# Patient Record
Sex: MALE | Race: WHITE
[De-identification: names, ages, dates, MRNs, and addresses within clinical notes are randomized per-mention and may not be internally consistent; named-entity substitution may affect disease eponyms.]

---

## 2017-02-28 ENCOUNTER — HOSPITAL ENCOUNTER (EMERGENCY)
Dept: HOSPITAL 25 - ED | Age: 27
Discharge: HOME | End: 2017-02-28
Payer: COMMERCIAL

## 2017-02-28 VITALS — SYSTOLIC BLOOD PRESSURE: 117 MMHG | DIASTOLIC BLOOD PRESSURE: 74 MMHG

## 2017-02-28 DIAGNOSIS — S62.501B: Primary | ICD-10-CM
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
## 2017-02-28 DIAGNOSIS — S61.011A: ICD-10-CM
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
## 2017-02-28 PROCEDURE — 90471 IMMUNIZATION ADMIN: CPT

## 2017-02-28 PROCEDURE — 90715 TDAP VACCINE 7 YRS/> IM: CPT

## 2017-02-28 PROCEDURE — 99282 EMERGENCY DEPT VISIT SF MDM: CPT

## 2017-02-28 NOTE — RAD
Indication: Right thumb injury.



2 views of the right thumb demonstrates a fracture through the distal tuft of the distal

phalanx of the thumb consistent with partial amputation.



IMPRESSION: Fracture through the distal tuft likely due to partial amputation of the right

distal thumb.

## 2017-02-28 NOTE — ED
Laceration/Wound HPI





- HPI Summary


HPI Summary: 


26m presents with right thumb laceration today.  He was using a table saw when 

the board he was holding got sucked into the saw.  He has full ROM of his 

finger.  He states that he does not feel any numbness or tingling, he only 

feels pain.  His last tetanus was 6 years ago.  He is left handed. 








- History of Current Complaint


Stated Complaint: RT HAND THUMB LAC


Time Seen by Provider: 02/28/17 13:51


Pain Intensity: 6





- Allergy/Home Medications


Allergies/Adverse Reactions: 


 Allergies











Allergy/AdvReac Type Severity Reaction Status Date / Time


 


No Known Allergies Allergy   Verified 02/28/17 16:33














PMH/Surg Hx/FS Hx/Imm Hx


Endocrine/Hematology History: 


   Denies: Hx Anticoagulant Therapy


Cardiovascular History: 


   Denies: Hx Hypertension


Infectious Disease History: No


Infectious Disease History: 


   Denies: Traveled Outside the [place] in Last 30 Days





- Family History


Known Family History: 


   Negative: Cardiac Disease





- Social History


Alcohol Use: Occasionally


Substance Use Type: Reports: None


Smoking Status (MU): Unknown if Ever Smoked





Review of Systems


Negative: Fever


Negative: Chest Pain


Negative: Shortness Of Breath


Positive: Other - right thumb laceration


All Other Systems Reviewed And Are Negative: Yes





Physical Exam


Triage Information Reviewed: Yes


Vital Signs On Initial Exam: 


 Initial Vitals











Temp Pulse Resp BP Pulse Ox


 


 97.3 F   72   20   117/74   100 


 


 02/28/17 13:43  02/28/17 13:43  02/28/17 13:43  02/28/17 13:43  02/28/17 13:43











Vital Signs Reviewed: Yes


Appearance: Positive: Well-Appearing


Skin: Positive: Warm, Dry, Other - large laceration of right thumb with skin 

avulsed 6 cm long and irregular by 2 cm wide


Head/Face: Positive: Normal Head/Face Inspection


Eyes: Positive: Normal, Conjunctiva Clear


Respiratory/Lung Sounds: Positive: Clear to Auscultation, Breath Sounds Present


Cardiovascular: Positive: Normal, RRR


Musculoskeletal: Positive: Other - nail attached with good capillary refill, 

sensation grossly intact, full ROM of thumb





Procedures





- Laceration/Wound Repair


  ** 1


Location: Other - right thumb


Description: Linear


Anesthesia: Digital, 1.0%


Length, Depth and Shape: 6cm by 2cm irregular with multiple intersecting 

laceration, skin avulsion


Betadine Prep?: Yes


Irrigated w/ Saline (ccs): 1,000


Laceration/Wound Explored: clean


Closure: Single Layer


Debridement: moderate


Suture Type: Prolene - 4-0


Number of Sutures: 10


Layer Closure?: No


Sterile Dressing Applied?: No





Diagnostics





- Vital Signs


 Vital Signs











  Temp Pulse Resp BP Pulse Ox


 


 02/28/17 13:43  97.3 F  72  20  117/74  100














- Laboratory


Lab Statement: Any lab studies that have been ordered have been reviewed, and 

results considered in the medical decision making process.





Laceration Repair Course/Dx





- Course


Course Of Treatment: 26M presents with right thumb laceration from table saw. 

is left handed, full ROM of finger, has tuff fracture so treated with ancef and 

tetanus shot, tried to reapproximate edges as well as could with moderate 

debridement but area had large avulsion and was unable to suture area, had dr mario look at wound and areas are can not be sutured, placed surgicel on 

area and applied pressure dressing and splint and told to follow up with ortho 

for evaulation of tendons of hands, placed on antibiotics, patient understands 

and agrees with plan





- Differential Dx


Differental Diagnoses: Avulsion, Fracture, Laceration, Tendon Laceration





- Clinical Impression


Provider Diagnoses: 


 Laceration of right thumb, Open fracture of tuft of distal phalanx of finger








Discharge





- Discharge Plan


Condition: Good


Disposition: HOME


Prescriptions: 


Cephalexin CAP* [Keflex CAP*] 500 mg PO BID #20 cap


Ibuprofen TAB* [Motrin TAB* 800 MG] 800 mg PO Q6H #30 tab


Patient Education Materials:  Care For Your Stitches (ED)


Referrals: 


Michele Butt MD [Medical Doctor] - 


No Primary Care Phys,NOPCP [Primary Care Provider] - 


Additional Instructions: 


Take antibiotic twice a day for 10 days starting tomorrow


Take ibuprofen every 6 hours for pain as needed


Change dressing tomorrow leaving absorbable hemostat on wound and rewrap, 


Keep brace on area until follow up with ortho


Follow up with ortho


Return to ED if 10-14 days to have sutures removed


Return to ED if develop signs of infection such as fever, spreading redness, or 

pus.

## 2017-09-24 ENCOUNTER — HOSPITAL ENCOUNTER (EMERGENCY)
Dept: HOSPITAL 25 - ED | Age: 27
Discharge: HOME | End: 2017-09-24
Payer: COMMERCIAL

## 2017-09-24 VITALS — SYSTOLIC BLOOD PRESSURE: 125 MMHG | DIASTOLIC BLOOD PRESSURE: 83 MMHG

## 2017-09-24 DIAGNOSIS — M25.461: ICD-10-CM

## 2017-09-24 DIAGNOSIS — M25.561: Primary | ICD-10-CM

## 2017-09-24 LAB
ADD DIFF/SLIDE REVIEW?: (no result)
ALBUMIN SERPL BCG-MCNC: 4.4 G/DL (ref 3.2–5.2)
ALP SERPL-CCNC: 50 U/L (ref 34–104)
ALT SERPL W P-5'-P-CCNC: 20 U/L (ref 7–52)
ANION GAP SERPL CALC-SCNC: 4 MMOL/L (ref 2–11)
AST SERPL-CCNC: 16 U/L (ref 13–39)
BUN SERPL-MCNC: 15 MG/DL (ref 6–24)
BUN/CREAT SERPL: 19 (ref 8–20)
CALCIUM SERPL-MCNC: 9.3 MG/DL (ref 8.6–10.3)
CHLORIDE SERPL-SCNC: 101 MMOL/L (ref 101–111)
GLOBULIN SER CALC-MCNC: 2.4 G/DL (ref 2–4)
GLUCOSE SERPL-MCNC: 97 MG/DL (ref 70–100)
HCO3 SERPL-SCNC: 30 MMOL/L (ref 22–32)
HCT VFR BLD AUTO: 46 % (ref 42–52)
HGB BLD-MCNC: 15.9 G/DL (ref 14–18)
MCH RBC QN AUTO: 31 PG (ref 27–31)
MCHC RBC AUTO-ENTMCNC: 35 G/DL (ref 31–36)
MCV RBC AUTO: 89 FL (ref 80–94)
POTASSIUM SERPL-SCNC: 4 MMOL/L (ref 3.5–5)
PROT SERPL-MCNC: 6.8 G/DL (ref 6.4–8.9)
RBC # BLD AUTO: 5.15 10^6/UL (ref 4–5.4)
SODIUM SERPL-SCNC: 135 MMOL/L (ref 133–145)
URATE SERPL-MCNC: 5.6 MG/DL (ref 4.4–7.6)
WBC # BLD AUTO: 4.6 10^3/UL (ref 3.5–10.8)

## 2017-09-24 PROCEDURE — 83605 ASSAY OF LACTIC ACID: CPT

## 2017-09-24 PROCEDURE — 85025 COMPLETE CBC W/AUTO DIFF WBC: CPT

## 2017-09-24 PROCEDURE — 36415 COLL VENOUS BLD VENIPUNCTURE: CPT

## 2017-09-24 PROCEDURE — 80053 COMPREHEN METABOLIC PANEL: CPT

## 2017-09-24 PROCEDURE — 99282 EMERGENCY DEPT VISIT SF MDM: CPT

## 2017-09-24 PROCEDURE — 86140 C-REACTIVE PROTEIN: CPT

## 2017-09-24 PROCEDURE — 85652 RBC SED RATE AUTOMATED: CPT

## 2017-09-24 PROCEDURE — 84550 ASSAY OF BLOOD/URIC ACID: CPT

## 2017-09-24 NOTE — ED
Lower Extremity





- HPI Summary


HPI Summary: 





27 male presents to ED with complaints of right knee pain, and concern for 

infection. States it began 4-5 days ago and he was seen at  for the same 

complaint. Started on augmentin, has taken 3.5 days doses and states has not 

had improvement. States he popped a "pimple" with a needle on skin of right 

knee cap 5 days ago and since has been having "burning, sharp, shooting" pain 

and swelling. States the "pimple" looks much better and he has just been 

covering with band-aid. No other complaints other than having some shooting 

pain in right lower leg intermittently. Admits to some swelling and that it 

feels warm. Has not taken any other medications. Admits to kneeling on his 

knees a lot at work without wearing his knee pads. No other PMHx. Denies fever 

and current erythema. Denies numbness/tingling. Able to bear weight and walk.





- History of Current Complaint


Chief Complaint: EDExtremityLower


Stated Complaint: RT KNEE POSS INFECTION/BURNING PAIN


Time Seen by Provider: 09/24/17 10:55


Hx Obtained From: Patient


Mechanism Of Injury: Unknown, Other - popped small abscess over right knee cap


Onset of Pain: Days


Onset/Duration: Still Present


Severity Initially: Mild


Severity Currently: Moderate


Pain Intensity: 5


Pain Scale Used: 0-10 Numeric


Timing: Intermittent


Location: Is Discrete @ - right knee


Character Of Pain: Sharp, Aching, Burning


Associated Signs And Symptoms: Positive: Swelling, Redness - "resolved", Knee 

Pain


Aggravating Factor(s): Standing, Ambulation, Weight Bearing


Alleviating Factor(s): Rest


Able to Bear Weight: Yes - no issue with walking 





- Allergies/Home Medications


Allergies/Adverse Reactions: 


 Allergies











Allergy/AdvReac Type Severity Reaction Status Date / Time


 


No Known Allergies Allergy   Verified 09/24/17 10:50














PMH/Surg Hx/FS Hx/Imm Hx


Endocrine/Hematology History: 


   Denies: Hx Anticoagulant Therapy


Cardiovascular History: 


   Denies: Hx Hypertension


Musculoskeletal History: 


   Denies: Hx Arthritis





- Surgical History


Surgery Procedure, Year, and Place: no





- Immunization History


Immunizations Up to Date: Yes


Infectious Disease History: No


Infectious Disease History: 


   Denies: Traveled Outside the [place] in Last 30 Days





- Family History


Known Family History: 


   Negative: Cardiac Disease





- Social History


Alcohol Use: Occasionally


Substance Use Type: Reports: None


Smoking Status (MU): Unknown if Ever Smoked





Review of Systems


Constitutional: Negative


Cardiovascular: Negative


Respiratory: Negative


Gastrointestinal: Negative


Positive: Arthralgia, Myalgia - right knee, Edema - right knee


Positive: Rash - right knee- resolved


Neurological: Negative


All Other Systems Reviewed And Are Negative: Yes





Physical Exam


Triage Information Reviewed: Yes


Vital Signs On Initial Exam: 


 Initial Vitals











Temp Pulse Resp BP Pulse Ox


 


 97.8 F   85   15   125/83   98 


 


 09/24/17 10:47  09/24/17 10:47  09/24/17 10:47  09/24/17 10:47  09/24/17 10:47











Vital Signs Reviewed: Yes


Appearance: Positive: Well-Appearing, No Pain Distress, Well-Nourished


Skin: Positive: Warm, Skin Color Reflects Adequate Perfusion, Dry, Cold, 

Erythema @ - small pea-sized erythematous scab over right knee appears to be 

healing, no drainage, edema or concern for infection. non raised. no 

surrounding cellulitis.  Negative: Soft, Pale, Weeping Skin/Lesions


Head/Face: Positive: Normal Head/Face Inspection


Eyes: Positive: Conjunctiva Clear


ENT: Positive: Hearing grossly normal


Neck: Positive: Supple, Nontender, No Lymphadenopathy


Respiratory/Lung Sounds: Positive: Clear to Auscultation, Breath Sounds 

Present.  Negative: Rales, Rhonchi, Wheezes


Cardiovascular: Positive: Normal, RRR, Pulses are Symmetrical in both Upper and 

Lower Extremities - 2+ pedal.  Negative: Murmur, Rub


Musculoskeletal: Positive: Normal, Strength/ROM Intact, Pain @ - with some 

movement of anterior right knee however, able and only at times. CMS intact. 

palpation of anterior/lateral right knee midly tender on palpation, Edema Right 

- very minimal edema noted over anterior right knee when compared to left, warm 

to touch b/l knees, Other - no sign of erythema, or cellulitis/infection.  

Negative: Limited @


Neurological: Positive: Normal, Sensory/Motor Intact - sensation intact, Alert, 

Oriented to Person Place, Time, CN Intact II-III, Reflexes Intact, NV Bundle 

Intact Distally, Normal Gait


Psychiatric: Positive: Affect/Mood Appropriate





- Jessie Coma Scale


Coma Scale Total: 15





Diagnostics





- Vital Signs


 Vital Signs











  Temp Pulse Resp BP Pulse Ox


 


 09/24/17 10:47  97.8 F  85  15  125/83  98














- Laboratory


Lab Results: 


 Lab Results











  09/24/17 09/24/17 09/24/17 Range/Units





  11:51 11:51 11:51 


 


WBC  4.6    (3.5-10.8)  10^3/ul


 


RBC  5.15    (4.0-5.4)  10^6/ul


 


Hgb  15.9    (14.0-18.0)  g/dl


 


Hct  46    (42-52)  %


 


MCV  89    (80-94)  fL


 


MCH  31    (27-31)  pg


 


MCHC  35    (31-36)  g/dl


 


RDW  13    (10.5-15)  %


 


Plt Count  172    (150-450)  10^3/ul


 


MPV  11 H    (7.4-10.4)  um3


 


Neut % (Auto)  60.5    (38-83)  %


 


Lymph % (Auto)  23.4 L    (25-47)  %


 


Mono % (Auto)  10.1 H    (1-9)  %


 


Eos % (Auto)  4.6    (0-6)  %


 


Baso % (Auto)  1.4    (0-2)  %


 


Absolute Neuts (auto)  2.8    (1.5-7.7)  10^3/ul


 


Absolute Lymphs (auto)  1.1    (1.0-4.8)  10^3/ul


 


Absolute Monos (auto)  0.5    (0-0.8)  10^3/ul


 


Absolute Eos (auto)  0.2    (0-0.6)  10^3/ul


 


Absolute Basos (auto)  0.1    (0-0.2)  10^3/ul


 


Absolute Nucleated RBC  0    10^3/ul


 


Nucleated RBC %  0    


 


Sodium   135   (133-145)  mmol/L


 


Potassium   4.0   (3.5-5.0)  mmol/L


 


Chloride   101   (101-111)  mmol/L


 


Carbon Dioxide   30   (22-32)  mmol/L


 


Anion Gap   4   (2-11)  mmol/L


 


BUN   15   (6-24)  mg/dL


 


Creatinine   0.79   (0.67-1.17)  mg/dL


 


Est GFR ( Amer)   150.2   (>60)  


 


Est GFR (Non-Af Amer)   116.8   (>60)  


 


BUN/Creatinine Ratio   19.0   (8-20)  


 


Glucose   97   ()  mg/dL


 


Lactic Acid    0.7  (0.5-2.0)  mmol/L


 


Calcium   9.3   (8.6-10.3)  mg/dL


 


Total Bilirubin   0.90   (0.2-1.0)  mg/dL


 


AST   16   (13-39)  U/L


 


ALT   20   (7-52)  U/L


 


Alkaline Phosphatase   50   ()  U/L


 


Total Protein   6.8   (6.4-8.9)  g/dL


 


Albumin   4.4   (3.2-5.2)  g/dL


 


Globulin   2.4   (2-4)  g/dL


 


Albumin/Globulin Ratio   1.8   (1-3)  











Result Diagrams: 


 09/24/17 11:51





 09/24/17 11:51


Lab Statement: Any lab studies that have been ordered have been reviewed, and 

results considered in the medical decision making process.





- Radiology


  ** right knee


Xray Interpretation: No Acute Changes - Unremarkable right knee.


Radiology Interpretation Completed By: Radiologist





Lower Extremity Course/Dx





- Course


Course Of Treatment: labs obtained and negative. ESR and CRP. no concern for 

infection, gout or septic arthritis at this time. x-ray obtained and negative. 

normal vitals, afebrile. Possibly having small effusion and pain from overuse 

and trauma at work. Patient states he is on his knees and does not wear knee 

protection. No other emergent etiology at this time. Continue antibiotics and 

additionally add ibuprofen RICE and follow up ortho. Given ace bandage for 

compression and support. Aware of worsening signs and symptoms. Recommend using 

knee protection while at work. Follow up PCP. Return if worsens or new symptoms 

develop/does not improve.





- Diagnoses


Differential Diagnosis/HQI/PQRI: Positive: Arthritis, Cellulitis, Dislocation, 

Fracture (Closed), Gout, Infection, Septic Arthritis, Sprain, Strain, Other - 

joint effusion


Provider Diagnoses: 


 Pain and swelling of right knee








Discharge





- Discharge Plan


Condition: Stable


Disposition: HOME


Patient Education Materials:  Swollen Knee Joint (ED), Knee Pain (ED)


Referrals: 


No Primary Care Phys,NOPCP [Primary Care Provider] - 


Sherrell Bucio MD [Medical Doctor] - 


Additional Instructions: 


Rest, ice, elevated and keep ace bandage applied for compression.


Recommend wearing knee pads while at work. 


Ibuprofen/Aleve for inflammation. 


Continue antibiotics until entire dose is finished, as directed.


If symptoms worsen or do not improve please seek medical attention as discussed 

(fever, redness or increased swelling/pain).


Call and make an appointment with ortho if symptoms do not improve or worsen.

## 2017-09-24 NOTE — RAD
Indication: Right knee pain.



4 views of the right knee demonstrates no fracture. No joint effusion is noted. No other

bone or joint abnormality identified.



IMPRESSION: Unremarkable right knee.

## 2019-06-27 ENCOUNTER — HOSPITAL ENCOUNTER (EMERGENCY)
Dept: HOSPITAL 25 - ED | Age: 29
Discharge: HOME | End: 2019-06-27
Payer: COMMERCIAL

## 2019-06-27 DIAGNOSIS — S80.11XA: Primary | ICD-10-CM

## 2019-06-27 DIAGNOSIS — Z87.891: ICD-10-CM
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
## 2019-06-27 PROCEDURE — 99282 EMERGENCY DEPT VISIT SF MDM: CPT

## 2019-06-27 NOTE — ED
Lower Extremity





- HPI Summary


HPI Summary: 


28-year-old male presents with right lower leg pain.  Patient states that 5 

days ago he was building a swing out of logs and the log that he was using for 

a crossbar which was a 12' x 10" Pine log slipped from its bracket and ended up 

striking him in the medial aspect of his proximal right lower leg immediately 

below his knee.  He states that over the past few days he is had some increased 

pain primarily in the back of his calf.  He has taken ibuprofen with some 

relief in the pain.  He has been able to walk and bear weight on the leg.  

Denies fever, chills, weakness, numbness, or tingling. 








- History of Current Complaint


Chief Complaint: EDExtremityUpper


Stated Complaint: RT LEG INJURY PER PT


Time Seen by Provider: 06/27/19 21:36


Hx Obtained From: Patient


Pain Intensity: 5





- Allergies/Home Medications


Allergies/Adverse Reactions: 


 Allergies











Allergy/AdvReac Type Severity Reaction Status Date / Time


 


No Known Allergies Allergy   Verified 06/27/19 19:16











Home Medications: 


 Home Medications





NK [No Home Medications Reported]  06/27/19 [History Confirmed 06/27/19]











PMH/Surg Hx/FS Hx/Imm Hx


Previously Healthy: Yes - Denies significant PMH


Endocrine/Hematology History: 


   Denies: Hx Anticoagulant Therapy


Cardiovascular History: 


   Denies: Hx Hypertension


Musculoskeletal History: 


   Denies: Hx Arthritis





- Surgical History


Surgical History: Yes


Surgery Procedure, Year, and Place: ORIF right femur





- Immunization History


Immunizations Up to Date: Yes


Infectious Disease History: No


Infectious Disease History: 


   Denies: Traveled Outside the [place] in Last 30 Days





- Family History


Known Family History: Positive: Non-Contributory





- Social History


Occupation: Employed Full-time


Lives: Alone


Alcohol Use: Occasionally


Substance Use Type: Reports: None


Smoking Status (MU): Former Smoker





Review of Systems


Negative: Fever, Chills


Cardiovascular: Negative


Respiratory: Negative


Gastrointestinal: Negative


Genitourinary: Negative


Positive: Other - See HPI


Positive: Bruising, Other - Abrasion


Neurological: Negative


All Other Systems Reviewed And Are Negative: Yes





Physical Exam





- Summary


Physical Exam Summary: 


GENERAL APPEARANCE: Well developed, well nourished, alert and cooperative, and 

appears to be in no acute distress.





CARDIAC: Normal S1 and S2. No S3, S4 or murmurs. Rhythm is regular. There is no 

peripheral edema, cyanosis or pallor.





LUNGS: Clear to auscultation without rales, rhonchi, wheezing or diminished 

breath sounds.





ABDOMEN: Positive bowel sounds. Soft, nondistended, nontender. No guarding or 

rebound. No masses or hepatosplenomegally.





MUSKULOSKELETAL: ROM intact to all extremities. No joint erythema or 

tenderness. Normal muscular development. Normal gait.





EXTREMITIES: Large abrasion approximately 4 cm in diameter with surrounding 

ecchymosis to the proximal medial right lower leg. Calf is supple but mildly 

tender with palpation. The leg is warm and well perfused. Capillary refill is 

less than 2 seconds. Pedal pulses intact. Sensation intact.





SKIN: Skin normal color, texture and turgor with no lesions or eruptions.





Triage Information Reviewed: Yes


Vital Signs On Initial Exam: 


 Initial Vitals











Temp Pulse Resp BP Pulse Ox


 


 98.3 F   96   16   124/65   98 


 


 06/27/19 19:14  06/27/19 19:14  06/27/19 19:14  06/27/19 19:14  06/27/19 19:14











Vital Signs Reviewed: Yes





Diagnostics





- Vital Signs


 Vital Signs











  Temp Pulse Resp BP Pulse Ox


 


 06/27/19 21:17  98.9 F  71  16  118/54  99


 


 06/27/19 19:14  98.3 F  96  16  124/65  98














- Laboratory


Lab Statement: Any lab studies that have been ordered have been reviewed, and 

results considered in the medical decision making process.





- Radiology


  ** No standard instances


Radiology Interpretation Completed By: ED Physician - No acute fracture





Lower Extremity Course/Dx





- Course


Course Of Treatment: 28-year-old male presents with right lower leg pain.  

Patient states that 5 days ago he was building a swing out of logs and the log 

that he was using for a crossbar which was a 12' x 10" Pine log slipped from 

its bracket and ended up striking him in the medial aspect of his proximal 

right lower leg immediately below his knee.  He states that over the past few 

days he is had some increased pain primarily in the back of his calf.  He has 

taken ibuprofen with some relief in the pain.  He has been able to walk and 

bear weight on the leg.  Denies fever, chills, weakness, numbness, or tingling.

  Afebrile.  Vital signs stable.  Patient had a large abrasion approximately 4 

cm in diameter with surrounding ecchymosis to the proximal medial right lower 

leg. Calf was supple but mildly tender with palpation. The leg was warm and 

well perfused. Capillary refill was less than 2 seconds. Pedal pulses intact. 

Sensation intact.  A tib-fib x-ray showed no acute fracture.  Discussed with 

the patient that I suspect that he has a bad contusion of the calf muscle 

however I did also discuss with him that I could not fully rule out the 

possibility of compartment syndrome although with the supple calf and good 

perfusion I do not feel that this eased urgently evaluated at this time.  He is 

to follow-up with orthopedic surgery in one to 2 days.  Recommending over-the-

counter analgesics and RICE.  Anticipatory guidance and warning symptoms were 

reviewed with the patient.  I stressed that he should have a very low threshold 

to return to the emergency room if he has continued worsening of symptoms.  He 

should verbalizes understanding and agrees with plan of care.





- Diagnoses


Differential Diagnosis/HQI/PQRI: Positive: Compartment Syndrome, Contusion, 

Fracture (Closed), Infection


Provider Diagnoses: 


 Contusion of right lower leg








Discharge





- Sign-Out/Discharge


Documenting (check all that apply): Patient Departure


Patient Received Moderate/Deep Sedation with Procedure: No





- Discharge Plan


Condition: Stable


Disposition: HOME


Patient Education Materials:  Contusion in Adults (ED)


Referrals: 


No Primary Care Phys,NOPCP [Primary Care Provider] - 


Michele Butt MD [Medical Doctor] - 


Additional Instructions: 


The x-ray performed in the clinic today showed no evidence of a fracture. You 

have a large contusion to the leg which could be the cause of your discomfort 

however I cannot rule out that you may have a condition called compartment 

syndrome.





Rest the leg as much as possible.





Apply ice to the affected area for 15-20 minutes at least 4 times a day to help 

with the pain and swelling.





Elevate the leg to help reduce swelling.





Take acetaminophen (Tylenol) or ibuprofen (Advil, Motrin) according to 

directions as needed for pain.





Follow up with orthopedic surgery in 1-2 days if symptoms do not improve.





Seek immediate medical attention if you have severe pain not managed with pain 

medication, you are unable to walk or bear any weight, develop numbness or 

tingling in the foot or toes, or have any worsening of symptoms.





- Billing Disposition and Condition


Condition: STABLE


Disposition: Home